# Patient Record
Sex: MALE | Race: WHITE
[De-identification: names, ages, dates, MRNs, and addresses within clinical notes are randomized per-mention and may not be internally consistent; named-entity substitution may affect disease eponyms.]

---

## 2023-02-09 ENCOUNTER — HOSPITAL ENCOUNTER (OUTPATIENT)
Dept: HOSPITAL 90 - SHCH | Age: 68
Discharge: HOME | End: 2023-02-09
Attending: INTERNAL MEDICINE
Payer: MEDICARE

## 2023-02-09 DIAGNOSIS — I65.23: Primary | ICD-10-CM

## 2023-02-09 DIAGNOSIS — F50.89: ICD-10-CM

## 2023-02-09 PROCEDURE — 93880 EXTRACRANIAL BILAT STUDY: CPT

## 2023-02-10 ENCOUNTER — HOSPITAL ENCOUNTER (OUTPATIENT)
Dept: HOSPITAL 90 - SHCH | Age: 68
Discharge: HOME | End: 2023-02-10
Attending: INTERNAL MEDICINE
Payer: MEDICARE

## 2023-02-10 DIAGNOSIS — I25.118: Primary | ICD-10-CM

## 2023-02-10 PROCEDURE — 78452 HT MUSCLE IMAGE SPECT MULT: CPT

## 2023-02-10 PROCEDURE — 93017 CV STRESS TEST TRACING ONLY: CPT

## 2023-02-10 PROCEDURE — 96374 THER/PROPH/DIAG INJ IV PUSH: CPT

## 2023-04-20 ENCOUNTER — HOSPITAL ENCOUNTER (OUTPATIENT)
Dept: HOSPITAL 90 - RAH | Age: 68
Discharge: HOME | End: 2023-04-20
Attending: INTERNAL MEDICINE
Payer: COMMERCIAL

## 2023-04-20 DIAGNOSIS — I25.10: ICD-10-CM

## 2023-04-20 DIAGNOSIS — Z13.6: Primary | ICD-10-CM

## 2023-04-20 PROCEDURE — 75571 CT HRT W/O DYE W/CA TEST: CPT

## 2025-01-02 ENCOUNTER — HOSPITAL ENCOUNTER (EMERGENCY)
Dept: HOSPITAL 90 - EDH | Age: 70
Discharge: HOME | End: 2025-01-02
Payer: MEDICARE

## 2025-01-02 VITALS
DIASTOLIC BLOOD PRESSURE: 83 MMHG | HEART RATE: 78 BPM | SYSTOLIC BLOOD PRESSURE: 145 MMHG | OXYGEN SATURATION: 95 % | RESPIRATION RATE: 16 BRPM

## 2025-01-02 VITALS — BODY MASS INDEX: 26.99 KG/M2 | HEIGHT: 67 IN | WEIGHT: 171.96 LBS

## 2025-01-02 VITALS — TEMPERATURE: 99 F

## 2025-01-02 DIAGNOSIS — Z88.0: ICD-10-CM

## 2025-01-02 DIAGNOSIS — G44.029: ICD-10-CM

## 2025-01-02 DIAGNOSIS — I10: ICD-10-CM

## 2025-01-02 DIAGNOSIS — E11.9: ICD-10-CM

## 2025-01-02 DIAGNOSIS — Z79.899: ICD-10-CM

## 2025-01-02 DIAGNOSIS — K57.30: Primary | ICD-10-CM

## 2025-01-02 DIAGNOSIS — F17.200: ICD-10-CM

## 2025-01-02 DIAGNOSIS — Z79.84: ICD-10-CM

## 2025-01-02 DIAGNOSIS — E78.00: ICD-10-CM

## 2025-01-02 LAB
ALBUMIN SERPL-MCNC: 3.7 G/DL (ref 3.5–5)
ALT SERPL-CCNC: 29 U/L (ref 12–78)
APPEARANCE UR: CLEAR
AST SERPL-CCNC: 25 U/L (ref 10–37)
BASOPHILS # BLD AUTO: 0.08 K/UL (ref 0–0.2)
BASOPHILS NFR BLD AUTO: 0.8 % (ref 0–5)
BILIRUB DIRECT SERPL-MCNC: 0.1 MG/DL (ref 0–0.3)
BILIRUB SERPL-MCNC: 0.4 MG/DL (ref 0.2–1)
BILIRUB UR QL STRIP: NEGATIVE MG/DL
BUN SERPL-MCNC: 21 MG/DL (ref 7–18)
CHLORIDE SERPL-SCNC: 97 MMOL/L (ref 101–111)
CK SERPL-CCNC: 173 U/L (ref 21–232)
CO2 SERPL-SCNC: 30 MMOL/L (ref 21–32)
COLOR UR: YELLOW
CREAT SERPL-MCNC: 1.1 MG/DL (ref 0.5–1.3)
EOSINOPHIL # BLD AUTO: 0.09 K/UL (ref 0–0.7)
EOSINOPHIL NFR BLD AUTO: 0.9 % (ref 0–8)
ERYTHROCYTE [DISTWIDTH] IN BLOOD BY AUTOMATED COUNT: 14.5 % (ref 11–15.5)
GFR SERPL CREATININE-BSD FRML MDRD: 73 ML/MIN (ref 90–?)
GLUCOSE SERPL-MCNC: 232 MG/DL (ref 70–105)
GLUCOSE UR STRIP-MCNC: NEGATIVE MG/DL
HCT VFR BLD AUTO: 46.2 % (ref 42–54)
HGB UR QL STRIP: NEGATIVE
IMM GRANULOCYTES # BLD: 0.08 K/UL (ref 0–1)
KETONES UR STRIP-MCNC: NEGATIVE MG/DL
LEUKOCYTE ESTERASE UR QL STRIP: NEGATIVE LEU/UL
LYMPHOCYTES # SPEC AUTO: 0.5 K/UL (ref 1–4.8)
LYMPHOCYTES NFR SPEC AUTO: 5.2 % (ref 21–51)
MCH RBC QN AUTO: 29.7 PG (ref 27–33)
MCHC RBC AUTO-ENTMCNC: 33.1 G/DL (ref 32–36)
MCV RBC AUTO: 89.5 FL (ref 79–99)
MICRO URNS: NO
MONOCYTES # BLD AUTO: 1.9 K/UL (ref 0.1–1)
MONOCYTES NFR BLD AUTO: 18.1 % (ref 3–13)
NEUTROPHILS # BLD AUTO: 7.6 K/UL (ref 1.8–7.7)
NEUTROPHILS NFR BLD AUTO: 74.2 % (ref 40–77)
NITRITE UR QL STRIP: NEGATIVE
NRBC BLD MANUAL-RTO: 0 % (ref 0–0.19)
PH UR STRIP: 6.5 [PH] (ref 5–8)
PLATELET # BLD AUTO: 244 K/UL (ref 130–400)
POTASSIUM SERPL-SCNC: 4.2 MMOL/L (ref 3.5–5.1)
PROT SERPL-MCNC: 7.2 G/DL (ref 6–8.3)
PROT UR QL STRIP: NEGATIVE MG/DL
RBC # BLD AUTO: 5.16 MIL/UL (ref 4.5–6.2)
SODIUM SERPL-SCNC: 137 MMOL/L (ref 136–145)
SP GR UR STRIP: 1.02 (ref 1–1.03)
UROBILINOGEN UR STRIP-MCNC: 0.2 MG/DL (ref 0.2–1)
WBC # BLD AUTO: 10.3 K/UL (ref 4.8–10.8)
WBC MORPH BLD: (no result)

## 2025-01-02 PROCEDURE — 85025 COMPLETE CBC W/AUTO DIFF WBC: CPT

## 2025-01-02 PROCEDURE — 80048 BASIC METABOLIC PNL TOTAL CA: CPT

## 2025-01-02 PROCEDURE — 83690 ASSAY OF LIPASE: CPT

## 2025-01-02 PROCEDURE — 96360 HYDRATION IV INFUSION INIT: CPT

## 2025-01-02 PROCEDURE — 99285 EMERGENCY DEPT VISIT HI MDM: CPT

## 2025-01-02 PROCEDURE — 84484 ASSAY OF TROPONIN QUANT: CPT

## 2025-01-02 PROCEDURE — 74177 CT ABD & PELVIS W/CONTRAST: CPT

## 2025-01-02 PROCEDURE — 80076 HEPATIC FUNCTION PANEL: CPT

## 2025-01-02 PROCEDURE — 81003 URINALYSIS AUTO W/O SCOPE: CPT

## 2025-01-02 PROCEDURE — 36415 COLL VENOUS BLD VENIPUNCTURE: CPT

## 2025-01-02 PROCEDURE — 82550 ASSAY OF CK (CPK): CPT

## 2025-01-02 PROCEDURE — 83605 ASSAY OF LACTIC ACID: CPT

## 2025-01-02 RX ADMIN — SODIUM CHLORIDE ONE MLS/HR: 0.9 INJECTION, SOLUTION INTRAVENOUS at 15:02

## 2025-01-02 NOTE — ERN
General


Chief Complaint:  Abdominal Pain


Stated Complaint:  ABDOMINAL PAIN


Time Seen by MD:  14:22





History of Present Illness


Initial Comments


69-year-old male presents for left-sided abdominal pain for the last couple of 

months.  Left lower pain.  He reports that he feels like a mass or swelling from

that area.  No vomiting diarrhea weight loss constipation or urinary symptoms.  

Patient went to his PCP and was sent here for CT imaging.  Of note, he also 

reports a cluster headache currently.  He has a history of cluster headaches.  

He does not think this is related to his abdomen.


Allergies:  


Coded Allergies:  


     Penicillins (Unverified  Allergy, Unknown, 8/8/16)


Home Meds


Reported Medications


[Subsysfentanyl]   No Conflict Check, 300 MG SL QID PRN for CLUSTER HEADACHE


   8/8/16


Sumatriptan Succinate (Sumatriptan Succinate) 100 Mg Tablet, 100 MG PO AD PRN 

for CLUSTER HEADACHE, TAB


   8/8/16


Tamsulosin HCl (Tamsulosin HCl) 0.4 Mg Cap.er.24h, 0.4 MG PO DAILY, CAPSULE.DR


   8/8/16


Propranolol HCl (Propranolol HCl) 120 Mg Cap.sa.24h, 120 MG PO DAILY, ML


   8/8/16


Sulfasalazine (Sulfasalazine) 500 Mg Tablet, 500 MG PO BID, TAB


   8/8/16


Celecoxib (Celebrex 200Mg Cap) 200 Mg Cap, 200 MG PO BID, CAP


   8/8/16


Pravastatin Sodium (Pravachol) 40 Mg Tablet, 40 MG PO HS, TAB


   8/8/16


Trazodone HCl (Trazodone HCl) 100 Mg Tablet, 100 MG PO HS, TAB


   8/8/16


Pioglitazone HCl (Pioglitazone HCl) 30 Mg Tablet, 30 MG PO DAILY, TAB


   8/8/16


Glipizide (Glipizide ER) 5 Mg Tab.er.24, 5 MG PO BID


   8/8/16


Metformin HCl (Metformin HCl) 1,000 Mg Tablet, 1000 MG PO BID, TAB


   8/8/16


Gabapentin (Neurontin) 400 Mg Capsule, 1200 MG PO TID, CAP


   8/8/16


[Potassium Cit]   No Conflict Check, PO BID


   8/8/16


Hydrochlorothiazide (Hydrochlorothiazide) 25 Mg Tablet, 25 MG PO BID, TAB


   8/8/16


Magnesium Oxide (Magnesium) 500 Mg Capsule, 500 MG PO BID, CAP


   8/8/16


Tizanidine HCl (Tizanidine HCl) 4 Mg Capsule, 4 MG PO QID, CAP


   8/8/16


Buprenorphine (Butrans) 1 Each Patch.tdwk, 1 EACH TD QWEEK


   8/8/16


Acetaminophen with Codeine (Acetaminophen-Cod #4 Tablet) 1 Each Tablet, 1 EACH 

PO Q4-6HOURS PRN for PAIN, TAB


   8/8/16


Hydrocodone/Acetaminophen (Hydrocodon-Acetaminophn ) 1 Each Tablet, 1 EACH

PO Q4-6HOURS PRN for PAIN, TAB


   8/8/16





Past Medical History


Past Medical History:  Diabetes-Type II, High Cholesterol, Hypertension


Past Surgical History:  None





ROS Dictation


CONSTITUTIONAL:  No chills, no fever, no weakness, no diaphoresis, no malaise.


HEAD/FACE:  No signs of trauma. 


EENT:  No eye pain, no blurred vision, no tearing, no double vision, no ear 

pain, no ear discharge, no nose pain, no nasal congestion, no throat pain, no 

throat swelling, no mouth pain. 


RESPIRATORY:  No cough, no orthopnea, no SOB, no stridor, no wheezing. 


CARDIOVASCULAR:  No chest pain, no edema, no palpitations, no syncope. 


GASTROINTESTINAL/ABDOMINAL:   Left-sided abdominal pain


GENITOURINARY:  No abnormal discharge, no dysuria, no frequent urination, no 

hematuria. No complaints of pain in the genitals. 


MUSCULOSKELETAL:  No back pain, no gout, no joint pain, no joint swelling, no 

muscle pain, no muscle stiffness, no neck pain. 


INTEGUMENTARY:  No change in color, no change in hair/nails, no dryness, no 

lesion, no lumps, no rash. 


NEUROLOGICAL/PSYCH:  No anxiety, not depressed, no emotional problem, no 

headache, no numbness, no pre-existing deficit, no history of seizures,  no 

tremors, no weakness. 


HEMATOLOGIC/LYMPHATIC:  Not anemic, no history of blood clots, no apparent 

bleeding, no bruising, glands not swollen.


All Systems Negative, Except as Noted.





Physical Exam


Physical Exam Dictation


VITAL SIGNS:  Reviewed. 


GENERAL APPEARANCE:  Alert, oriented x3, no acute distress


HEAD AND FACE: Non-traumatic. 


EYES:   PERRL, pink conjunctivas, eyelid no trauma, anterior chamber clear. 


EARS:  Pinnas intact and no signs of trauma or erythema.  Ear canals clear and 

no discharge. TMs no erythema. 


NOSE:  No discharge, no bleeding. 


OROPHARYNX:  Mouth normal, teeth no caries, tongue pink.  Pharynx clear, no 

erythema.  Tonsils no exudates, no abscesses noted. Mucous membrane moist.


NECK:  Supple, non-tender, no thyromegaly, no masses, no JVD, no bruits. 


BREAST:  Deferred.


CHEST:   No tenderness, no crepitus, no paradoxical movement, no retractions.


LUNGS:  Clear, well-ventilated, symmetric, no rales, no wheezing, no rhonchi, no

stridor, good breath sounds bilaterally. 


HEART:  Regular rate, regular rhythm, no murmur, no gallops. 


VASCULAR: No peripheral edema.


ABDOMEN: Soft, positive bowel sounds, nondistended, no guarding, nontender, no 

rebound, no masses no hepatomegaly, no splenomegaly, no Hutton's sign, no herni

as. 


RECTAL: Deferred. 


GENITAL:  Deferred. 


NEUROLOGICAL:  Normal speech, gross motor function intact, gross sensory 

function intact.


MUSCULOSKELETAL:  Neck nontender, full range of motion, back nontender, full 

range of motion.


EXTREMITIES: Nontender, full range of motion. 


SKIN:  Color pink, dry, no turgor, no rash, no lacerations, no abrasions, no 

contusions.


LYMPHATICS:  Deferred.





Results


Laboratory and Microbiology


Lab and Micro Result





Laboratory Tests








Test


 1/2/25


14:55 1/2/25


15:24


 


White Blood Count


 10.3 K/uL


(4.8-10.8) 





 


Red Blood Count


 5.16 MIL/uL


(4.50-6.20) 





 


Hemoglobin


 15.3 g/dL


(14.0-18.0) 





 


Hematocrit


 46.2 % (42-54)


 





 


Mean Corpuscular Volume


 89.5 fL


(79-99) 





 


Mean Corpuscular Hemoglobin


 29.7 pg


(27.0-33.0) 





 


Mean Corpuscular Hemoglobin


Concent 33.1 g/dL


(32.0-36.0) 





 


Red Cell Distribution Width


 14.5 %


(11.0-15.5) 





 


Platelet Count


 244 K/uL


(130-400) 





 


Mean Platelet Volume


 9.4 fL


(7.5-10.5) 





 


Immature Granulocyte % (Auto) 0.8 % (0-1)   


 


Neutrophils (%) (Auto)


 74.2 %


(40.0-77.0) 





 


Lymphocytes (%) (Auto)


 5.2 %


(21.0-51.0)  L 





 


Monocytes (%) (Auto)


 18.1 %


(3.0-13.0)  H 





 


Eosinophils (%) (Auto)


 0.9 %


(0.0-8.0) 





 


Basophils (%) (Auto)


 0.8 %


(0.0-5.0) 





 


Neutrophils # (Auto)


 7.6 K/uL


(1.8-7.7) 





 


Lymphocytes # (Auto)


 0.5 K/uL


(1.0-4.8)  L 





 


Monocytes # (Auto)


 1.9 K/uL


(0.1-1.0)  H 





 


Eosinophils # (Auto)


 0.09 K/uL


(0.00-0.70) 





 


Basophils # (Auto)


 0.08 K/uL


(0.00-0.20) 





 


Absolute Immature Granulocyte


(auto 0.08 K/uL


(0-1) 





 


Nucleated Red Blood Cells


 0.0 %


(0.0-0.19) 





 


White Cell Morphology Comment See comments   


 


Sodium Level


 137 mmol/L


(136-145) 





 


Potassium Level


 4.2 mmol/L


(3.5-5.1) 





 


Chloride Level


 97 mmol/L


(101-111)  L 





 


Carbon Dioxide Level


 30 mmol/L


(21-32) 





 


Blood Urea Nitrogen


 21 mg/dL


(7-18)  H 





 


Creatinine


 1.1 mg/dL


(0.5-1.3) 





 


Glomerular Filtration Rate


Calc 73 mL/min


(>90) 





 


Random Glucose


 232 mg/dL


()  H 





 


Lactic Acid Level


 2.1 mmol/L


(0.8-2.5) 





 


Total Calcium


 9.3 mg/dL


(8.5-10.1) 





 


Total Bilirubin


 0.4 mg/dL


(0.2-1.0) 





 


Direct Bilirubin


 0.1 mg/dL


(0.0-0.3) 





 


Aspartate Amino Transf


(AST/SGOT) 25 U/L (10-37)


 





 


Alanine Aminotransferase


(ALT/SGPT) 29 U/L (12-78)


 





 


Alkaline Phosphatase


 117 U/L


() 





 


Total Creatine Kinase


 173 U/L


() 





 


Troponin I High Sensitivity 5 ng/L (4-75)   


 


Total Protein


 7.2 g/dL


(6.0-8.3) 





 


Albumin


 3.7 g/dL


(3.5-5.0) 





 


Lipase


 38 U/L (16-77)


 





 


Urine Color


 


 YELLOW


(YELLOW)


 


Urine Appearance  CLEAR (CLEAR)  


 


Urine pH  6.5 (5.0-8.0)  


 


Urine Specific Gravity


 


 1.019


(1.001-1.031)


 


Urine Protein


 


 NEGATIVE mg/dL


(NEGATIVE)


 


Urine Glucose (UA)


 


 NEGATIVE mg/dL


(NEGATIVE)


 


Urine Ketones


 


 NEGATIVE mg/dL


(NEGATIVE)


 


Urine Occult Blood


 


 NEGATIVE


(NEGATIVE)


 


Urine Nitrate


 


 NEGATIVE


(NEGATIVE)


 


Urine Bilirubin


 


 NEGATIVE mg/dL


(NEGATIVE)


 


Urine Urobilinogen


 


 0.2 mg/dL


(0.2-1.0)


 


Urine Leukocyte Esterase


 


 NEGATIVE


Bonnie/uL











MDM


CC: L sided abdominal pain   For months now.  Referred to the ER for imaging.  


Historian:  Patient 


Comorbidities:  Daily smoker, DM two, DLD, HTN, cluster headaches


Differential diagnosis, diverticulitis, mass, surgical pathology, kidney stones,

other.  


Labs ( independently ordered and interpreted by me ): No leukocytosis, no shift 

or bands.  BMP normal, glucose mildly elevated, lactic acid is normal, liver e

nzymes are normal.  Troponin is normal.  Lipase is normal.  Urinalysis is 

normal.


CT scan of the abdomen and pelvis with contrast ( independently ordered and 

interpreted by me ):  No acute surgical pathology no free air.  Possibly some 

diverticulosis, I do not see any signs of diverticulitis.  


Patient initially had a chronic cluster headache here in the ER.  He was given 

some oxygen in the headache improved.  This is likely had a related to his 

abdominal discomfort.  The patient also received 1 L of normal saline.  


Based on the patient's pretty significant tenderness to left lower quadrant 

along with a signs of diverticulosis, patient may have a diverticulitis although

he has a normal white count and stable vital signs.  I do think a short course 

of antibiotics is reasonable.  The patient will likely need to follow up as an 

outpatient for a colonoscopy as well.  I conveyed all of this information with 

the patient.  Also gave him a short course of pain medicine.














REASON: Q pain


ORDERING PHYSICIAN: WES MEDINA DO


PROCEDURE: ABD PEL W  -  CT ABDOMEN/PELVIS W/CONTRAST





CT ABDOMEN/PELVIS W/CONTRAST





REASON:   LLQ pain





COMPARISON: 





TECHNIQUE: Images are obtained from lung bases to symphysis pubis


following IV contrast, 100 cc Omnipaque 350.





FINDINGS:


Lung bases are clear. There are no focal liver lesions. There are


normal-appearing kidneys.. Spleen and pancreas  appear unremarkable. The


gallbladder appears normal as well. 





There is mild to moderate sigmoid diverticulosis without evidence of


diverticulitis. Bowel loops appear otherwise unremarkable. The appendix


was not separately identified. There is no evidence of free fluid or


intraperitoneal air. There are no focal fluid collections.





There is constipation of the aorta and iliac vessels without aneurysm or


stenosis. Aorta and retroperitoneum appear otherwise normal as do pelvic


soft tissue structures. The anterior abdominal wall is intact. Osseous


structures appear unremarkable.





IMPRESSION:


1. No acute finding in the abdomen or pelvis.


ED Course





Orders








Procedure Category Date Status





  Time 


 


Cbc With Differential LAB 1/2/25 Complete





  14:44 


 


Troponin I High LAB 1/2/25 Complete





Sensitivity  14:44 


 


Urinalysis Profile LAB 1/2/25 Complete





  14:44 


 


Ct Abdomen/Pelvis CT 1/2/25 Resulted





W/Contrast  14:44 


 


0.9%Nacl 1000ml (Ns PHA 1/2/25 Complete





1000ml)  15:00 


 


Creatine Kinase, Total LAB 1/2/25 Complete





  14:44 


 


Lipase LAB 1/2/25 Complete





  14:44 


 


Basic Metabolic Panel LAB 1/2/25 Complete





  14:44 


 


Hepatic Function Panel LAB 1/2/25 Complete





  14:44 


 


Lactic Acid LAB 1/2/25 Complete





  14:44 


 


*Nursing CPOE 1/2/25 Transmitted





Communication:  15:00 


 


Iohexol (Omnipaque) PHA 1/2/25 Complete





  16:00 


 


Lactic Acid (Removed) LAB 1/2/25 Logged





  18:16 








Current Medications








 Medications


  (Trade)  Dose


 Ordered  Sig/Yvonne


 Route


 PRN Reason  Start Time


 Stop Time Status Last Admin


Dose Admin


 


 Iohexol


  (Omnipaque)  35,000 mg  STK-MED ONCE


 IV


   1/2/25 16:00


 1/2/25 16:00 DC  





 


 Sodium Chloride  1,000 ml @ 


 0 mls/hr  ONCE  ONCE


 IV


   1/2/25 15:00


 1/2/25 15:01 DC 1/2/25 15:02











Vital Signs








  Date Time  Temp Pulse Resp B/P (MAP) Pulse Ox O2 Delivery O2 Flow Rate FiO2


 


1/2/25 18:00  78 16 145/83 95 Room Air* 0 21


 


1/2/25 14:16 99.0 85 20 105/63 99 Room Air* 0 21


 


1/2/25 14:16 99.0 85 20 105/63 99   











DX & DISP


Disposition:  Discharge


Departure


Impression:  


   Primary Impression:  Diverticulosis large intestine w/o perforation or 

   abscess w/o bleeding


   Additional Impression:  Cluster headache


Condition:  Stable


Scripts


Amoxicillin/Potassium Clav (Amox Tr-K Clv 875-125 mg Tab) 875 Mg-125 Mg Tablet


1 TAB PO BID for 7 Days, #14 TAB 0 Refills


   Prov: WES MEDINA DO         1/2/25 


Acetaminophen with Codeine (Acetaminophen-Cod #3 Tablet) 300 Mg-30 Mg Tablet


1 TAB PO Q6HPRN PRN for pain for 5 Days, #20 TAB 0 Refills


   Prov: WES MEDINA DO         1/2/25 


Meloxicam (Meloxicam) 15 Mg Tablet


15 MG PO DAILY PRN for PAIN for 10 Days, #10 TAB


   Prov: WES MEDINA DO         1/2/25


Referrals:  


MARIANNE MURPHY (PCP)











WES MEDINA DO                 Jan 2, 2025 14:57

## 2025-01-02 NOTE — NUR
AT THIS TIME REMOVED FROM 02. PATIENT STATES CLUSTER HEADACHE HAS SUBSIDED "ITS 
MANAGEABLE" ALTHOUGH STATES DISCOMFORT IS ALWAYS PRESENT TO HEAD.

## 2025-01-02 NOTE — HMCIMG
CT ABDOMEN/PELVIS W/CONTRAST



REASON:   LLQ pain



COMPARISON: 



TECHNIQUE: Images are obtained from lung bases to symphysis pubis

following IV contrast, 100 cc Omnipaque 350.



FINDINGS:

Lung bases are clear. There are no focal liver lesions. There are

normal-appearing kidneys.. Spleen and pancreas  appear unremarkable. The

gallbladder appears normal as well. 



There is mild to moderate sigmoid diverticulosis without evidence of

diverticulitis. Bowel loops appear otherwise unremarkable. The appendix

was not separately identified. There is no evidence of free fluid or

intraperitoneal air. There are no focal fluid collections.



There is constipation of the aorta and iliac vessels without aneurysm or

stenosis. Aorta and retroperitoneum appear otherwise normal as do pelvic

soft tissue structures. The anterior abdominal wall is intact. Osseous

structures appear unremarkable.



IMPRESSION:

1. No acute finding in the abdomen or pelvis.



CT was performed with one or more following dose reduction techniques:

automated exposure control, adjustment of the mA and kv according to

patient's size, or use of a iterative reconstruction technique.

## 2025-01-02 NOTE — NUR
PLACED ON 15L NON REBREATHER FOR CLUSTER HEADACAHE. PATIENT STATES HIS ONGOING 
TREATMENT FOR CLUSTER HEADACHES IS HIGH FLOW OXYGEN. NOTE NO SIGNS OR SYMPTOMS 
OF ACUTE RESPIRATORY DISTRESS,. AS WELL THE PATIENT DENIES SOB.

## 2025-01-20 ENCOUNTER — HOSPITAL ENCOUNTER (OUTPATIENT)
Dept: HOSPITAL 90 - LAB | Age: 70
Discharge: HOME | End: 2025-01-20
Attending: INTERNAL MEDICINE
Payer: MEDICARE

## 2025-01-20 DIAGNOSIS — I10: Primary | ICD-10-CM

## 2025-01-20 PROCEDURE — 36415 COLL VENOUS BLD VENIPUNCTURE: CPT

## 2025-01-20 PROCEDURE — 85378 FIBRIN DEGRADE SEMIQUANT: CPT

## 2025-02-03 ENCOUNTER — HOSPITAL ENCOUNTER (OUTPATIENT)
Dept: HOSPITAL 90 - SHCH | Age: 70
Discharge: HOME | End: 2025-02-03
Attending: INTERNAL MEDICINE
Payer: MEDICARE

## 2025-02-03 DIAGNOSIS — I25.10: Primary | ICD-10-CM

## 2025-02-03 PROCEDURE — 78452 HT MUSCLE IMAGE SPECT MULT: CPT

## 2025-02-03 PROCEDURE — 93017 CV STRESS TEST TRACING ONLY: CPT

## 2025-02-03 RX ADMIN — REGADENOSON ONE MG: 0.08 INJECTION, SOLUTION INTRAVENOUS at 15:41

## 2025-02-04 NOTE — [facility]
--------------- APPROVED REPORT --------------





Height: 5 ft  5in

Weight:  172 lbs



TEST INDICATIONS

CAD



The imaging protocol used to acquire images was Rest Tc-99m/stress Tc-99m 1 day



Consent: The procedure was explained and understood by the patient. Informerd consent was witnessed Bird Lr RN 

First, low dose rest was performed then high dose stress.



RESTING DATA: 

The resting ekg shows: NSR

Rest SPECT myocardial perfusion imaging was performed in supine position 76 minutes following the int
ravenous injection of 10.7 mCi of Tc-99 Sestamibi.

Time of rest injection: 08:40:     Date: 2025

Time of rest imagin:56:     Date: 2025



PHARMACOLOGIC STRESS: 

Pharmacologic stress test was performed by injecting regadenoson 0.4 mg IV push followed by the intra
venous injection of 32.4 mCi of Tc-99 Sestamibi.

Time of stress injection: 10:20:     Date: 2025

Time of stress imagin:04:     Date: 2025

Heart Rate at time of stress injection: 63 bpm.

Gated Stress SPECT was performed 104 minutes after stress injection.

The images were gated to evaluate regional wall motion and calculate left ventricular ejection fracti
on. 



STRESS DETAILS

Reason for Termination: Infusion complete

Stress Symptoms: No chest pain or symptoms

Max HR Achieved: 83 bpm

% of APMHR Achieved: 55

Max Blood Pressure: 147/54 mmHg

Stress ECG: NSR



Conclusion

No ischemia

No infarct

 LV ejection fraction of 57%

Normal LV wall motion

No evidence of increased lung uptake

Normal LV size at stress and rest